# Patient Record
Sex: MALE | Race: WHITE
[De-identification: names, ages, dates, MRNs, and addresses within clinical notes are randomized per-mention and may not be internally consistent; named-entity substitution may affect disease eponyms.]

---

## 2021-11-08 ENCOUNTER — HOSPITAL ENCOUNTER (EMERGENCY)
Dept: HOSPITAL 94 - ER | Age: 61
Discharge: PSYCHIATRIC OR SUBSTANCE USE FACILITY | End: 2021-11-08
Payer: MEDICARE

## 2021-11-08 VITALS — WEIGHT: 210.43 LBS | HEIGHT: 71 IN | BODY MASS INDEX: 29.46 KG/M2

## 2021-11-08 DIAGNOSIS — Z87.442: ICD-10-CM

## 2021-11-08 DIAGNOSIS — Z20.822: ICD-10-CM

## 2021-11-08 DIAGNOSIS — Z79.899: ICD-10-CM

## 2021-11-08 DIAGNOSIS — F20.9: Primary | ICD-10-CM

## 2021-11-08 DIAGNOSIS — I10: ICD-10-CM

## 2021-11-08 LAB
ALBUMIN SERPL BCP-MCNC: 3.9 G/DL (ref 3.4–5)
ALBUMIN/GLOB SERPL: 1.1 {RATIO} (ref 1.1–1.5)
ALP SERPL-CCNC: 84 IU/L (ref 46–116)
ALT SERPL W P-5'-P-CCNC: 24 U/L (ref 12–78)
AMPHETAMINES UR QL SCN: NEGATIVE
ANION GAP SERPL CALCULATED.3IONS-SCNC: 11 MMOL/L (ref 8–16)
AST SERPL W P-5'-P-CCNC: 31 U/L (ref 10–37)
BARBITURATES UR QL SCN: NEGATIVE
BASOPHILS # BLD AUTO: 0 X10'3 (ref 0–0.2)
BASOPHILS NFR BLD AUTO: 0.6 % (ref 0–1)
BENZODIAZ UR QL SCN: NEGATIVE
BILIRUB SERPL-MCNC: 0.5 MG/DL (ref 0.1–1)
BUN SERPL-MCNC: 10 MG/DL (ref 7–18)
BUN/CREAT SERPL: 8.3 (ref 5.4–32)
BZE UR QL SCN: NEGATIVE
CALCIUM SERPL-MCNC: 9.2 MG/DL (ref 8.5–10.1)
CANNABINOIDS UR QL SCN: NEGATIVE
CHLORIDE SERPL-SCNC: 105 MMOL/L (ref 99–107)
CLARITY UR: CLEAR
CO2 SERPL-SCNC: 27.9 MMOL/L (ref 24–32)
COLOR UR: YELLOW
CREAT SERPL-MCNC: 1.2 MG/DL (ref 0.6–1.1)
EOSINOPHIL # BLD AUTO: 0.1 X10'3 (ref 0–0.9)
EOSINOPHIL NFR BLD AUTO: 1.6 % (ref 0–6)
ERYTHROCYTE [DISTWIDTH] IN BLOOD BY AUTOMATED COUNT: 13.5 % (ref 11.5–14.5)
ETHANOL SERPL-MCNC: < 0.01 GM/DL (ref 0–0.01)
GFR SERPL CREATININE-BSD FRML MDRD: 62 ML/MIN
GLUCOSE SERPL-MCNC: 121 MG/DL (ref 70–104)
GLUCOSE UR STRIP-MCNC: NEGATIVE MG/DL
HCT VFR BLD AUTO: 43.8 % (ref 42–52)
HGB BLD-MCNC: 15.1 G/DL (ref 14–17.9)
HGB UR QL STRIP: NEGATIVE
KETONES UR STRIP-MCNC: NEGATIVE MG/DL
LEUKOCYTE ESTERASE UR QL STRIP: NEGATIVE
LYMPHOCYTES # BLD AUTO: 1 X10'3 (ref 1.1–4.8)
LYMPHOCYTES NFR BLD AUTO: 20.2 % (ref 21–51)
MCH RBC QN AUTO: 30.4 PG (ref 27–31)
MCHC RBC AUTO-ENTMCNC: 34.6 G/DL (ref 33–36.5)
MCV RBC AUTO: 87.8 FL (ref 78–98)
METHADONE UR QL SCN: NEGATIVE
MONOCYTES # BLD AUTO: 0.5 X10'3 (ref 0–0.9)
MONOCYTES NFR BLD AUTO: 9.8 % (ref 2–12)
NEUTROPHILS # BLD AUTO: 3.3 X10'3 (ref 1.8–7.7)
NEUTROPHILS NFR BLD AUTO: 67.8 % (ref 42–75)
NITRITE UR QL STRIP: NEGATIVE
OPIATES UR QL SCN: NEGATIVE
PCP UR QL SCN: NEGATIVE
PH UR STRIP: 7.5 [PH] (ref 4.8–8)
PLATELET # BLD AUTO: 189 X10'3 (ref 140–440)
PMV BLD AUTO: 6.9 FL (ref 7.4–10.4)
POTASSIUM SERPL-SCNC: 3.9 MMOL/L (ref 3.5–5.1)
PROT SERPL-MCNC: 7.6 G/DL (ref 6.4–8.2)
PROT UR QL STRIP: NEGATIVE MG/DL
RBC # BLD AUTO: 4.98 X10'6 (ref 4.7–6.1)
SODIUM SERPL-SCNC: 144 MMOL/L (ref 135–145)
SP GR UR STRIP: 1 (ref 1–1.03)
URN COLLECT METHOD CLASS: (no result)
UROBILINOGEN UR STRIP-MCNC: 0.2 E.U/DL (ref 0.2–1)
WBC # BLD AUTO: 4.8 X10'3 (ref 4.5–11)

## 2021-11-08 PROCEDURE — 36415 COLL VENOUS BLD VENIPUNCTURE: CPT

## 2021-11-08 PROCEDURE — 96372 THER/PROPH/DIAG INJ SC/IM: CPT

## 2021-11-08 PROCEDURE — 87635 SARS-COV-2 COVID-19 AMP PRB: CPT

## 2021-11-08 PROCEDURE — 80305 DRUG TEST PRSMV DIR OPT OBS: CPT

## 2021-11-08 PROCEDURE — 99285 EMERGENCY DEPT VISIT HI MDM: CPT

## 2021-11-08 PROCEDURE — 84443 ASSAY THYROID STIM HORMONE: CPT

## 2021-11-08 PROCEDURE — 85025 COMPLETE CBC W/AUTO DIFF WBC: CPT

## 2021-11-08 PROCEDURE — 80053 COMPREHEN METABOLIC PANEL: CPT

## 2021-11-08 PROCEDURE — 80320 DRUG SCREEN QUANTALCOHOLS: CPT

## 2021-11-08 PROCEDURE — 81003 URINALYSIS AUTO W/O SCOPE: CPT

## 2021-11-08 NOTE — NUR
The patient is refusing medications and stated he has no problems but he would 
be willing to take methadone.

## 2021-11-08 NOTE — NUR
The patient was moved to bed 26 in the ER overflow. He was quiet and pacing 
around his bed. At one point he was redirected back to his bed area by a male 
staff and he screamed back "Fuck you! You don't know who you are dealing with!" 
He did however go back to his bed. He was cooperative with the assessment 
however. He was asked why he was here and his reply was disarganized, fast, 
pressured and difficult to follow. He mentioned about something about [facility], television etc.

## 2021-11-09 RX ADMIN — OLANZAPINE SCH MG: 5 TABLET, ORALLY DISINTEGRATING ORAL at 20:00

## 2021-11-09 NOTE — NUR
0730 this am, spoke with [facility] Nurse [phone], patient has been 
prescribed Zyprexa 20mg BID and Cogentin 0.1mg BID.  Per custodial staff there is a 
good chance that Arsenio has been cheeking his medication.   Child aware, 
orders to be writte

## 2021-11-09 NOTE — NUR
The patient is slowly pacing in front of the nursing station. He is at times 
talking to himself and at other times laughing to himself.

## 2021-11-09 NOTE — NUR
Arsenio is laying on his bed, with the head of his bed up, arms crossed.  
Patient did give this writer the thumbs up when walking by.

## 2021-11-09 NOTE — NUR
The patient has been up and pacing periodically in the hallway. Attempted to 
ask the patient questions but he just shrugged his shoulders and did not give 
any verbal replies. He is laughing to himself. Eye contact is minimal. He is 
not able to state a plan for food shelter or clothing.

## 2021-11-09 NOTE — NUR
The patient is very internally preoccupied. Can be heard laughing to himself 
and at one point screamed out for no apparent reason. He is shadow boxing at 
the end of his bed.

## 2021-11-09 NOTE — NUR
Patient has been resting in bed since 9 this morning, patient has gotten up to 
use the restroom three times and ate 100% of lunch and asked for a second tray, 
this unit did have a extra tray and patient ate all of that tray as well.  
Arsenio will answer general question with yes and no, this writer did not 
attemept to talk about medication or any type of behavior that will be allowed 
on this unit.  Arsenio has been calm and pleasant up to this point.

## 2021-11-09 NOTE — NUR
LPS papers place in chart.  this wrtier called [facility] this am asking for [employer] 
papers, Sarah

## 2021-11-09 NOTE — NUR
Patient is mostly pacing all morning, right now patient is laying in his bed 
resting tossing from side to side.  Pending medicine orders.

## 2021-11-09 NOTE — NUR
The patient was up the majority of the night quietly pacing in the priest. He did 
nap for brief periods

## 2021-11-10 RX ADMIN — OLANZAPINE SCH MG: 5 TABLET, ORALLY DISINTEGRATING ORAL at 19:37

## 2021-11-10 RX ADMIN — OLANZAPINE SCH MG: 5 TABLET, ORALLY DISINTEGRATING ORAL at 08:00

## 2021-11-10 NOTE — NUR
RN received pt. awake, lying in supine position in 4 point restraints in bed. 
Pt.'s vitals obtain with /80, HR 90, SP02 95%, and HR 20. 

-------------------------------------------------------------------------------

Addendum: 11/10/21 at 0652 by NADIA

-------------------------------------------------------------------------------

Correction, Resp 20.

## 2021-11-10 NOTE — NUR
Pt. yelling, "I want out!" RN attempted to debrief with pt. regarding his 
aggressive behavior. Pt. states, "I takes two to tango". Pt. deemed unsafe to 
release from restraints.

## 2021-11-10 NOTE — NUR
attempted to assess the patient for injury but the patient stated, "fuck you!" 
and would not answer any questions.

## 2021-11-10 NOTE — NUR
Pt. released from 3 of 4 restraints. Pt.'s right ankle mainted in restraint. 
Pt. ate all of his breakfast and went to sleep.

## 2021-11-10 NOTE — NUR
The patient suddenly became extremely agitated and started charging staff and 
spitting on the floor. He was redirected back to his bed but he replied, "Fuck 
you nigger!" Zeina dawkins called. Patient placed on the floor and contained. Dr. Baptiste made aware and IM med orders were received. Patient moved to his bed and 
placed in 4 point restraints.

## 2021-11-10 NOTE — NUR
The patient refusing to speak with staff. He has been resting on his bed. He 
did not eat his dinner.

## 2021-11-11 LAB
BASOPHILS # BLD AUTO: 0 X10'3 (ref 0–0.2)
BASOPHILS NFR BLD AUTO: 0.2 % (ref 0–1)
EOSINOPHIL # BLD AUTO: 0.1 X10'3 (ref 0–0.9)
EOSINOPHIL NFR BLD AUTO: 0.8 % (ref 0–6)
ERYTHROCYTE [DISTWIDTH] IN BLOOD BY AUTOMATED COUNT: 13.4 % (ref 11.5–14.5)
HCT VFR BLD AUTO: 38.5 % (ref 42–52)
HGB BLD-MCNC: 13.2 G/DL (ref 14–17.9)
LYMPHOCYTES # BLD AUTO: 1 X10'3 (ref 1.1–4.8)
LYMPHOCYTES NFR BLD AUTO: 12 % (ref 21–51)
MCH RBC QN AUTO: 30.1 PG (ref 27–31)
MCHC RBC AUTO-ENTMCNC: 34.3 G/DL (ref 33–36.5)
MCV RBC AUTO: 87.7 FL (ref 78–98)
MONOCYTES # BLD AUTO: 0.7 X10'3 (ref 0–0.9)
MONOCYTES NFR BLD AUTO: 8.8 % (ref 2–12)
NEUTROPHILS # BLD AUTO: 6.5 X10'3 (ref 1.8–7.7)
NEUTROPHILS NFR BLD AUTO: 78.2 % (ref 42–75)
PLATELET # BLD AUTO: 163 X10'3 (ref 140–440)
PMV BLD AUTO: 7 FL (ref 7.4–10.4)
RBC # BLD AUTO: 4.39 X10'6 (ref 4.7–6.1)
WBC # BLD AUTO: 8.3 X10'3 (ref 4.5–11)

## 2021-11-11 RX ADMIN — OLANZAPINE SCH MG: 5 TABLET, ORALLY DISINTEGRATING ORAL at 10:23

## 2021-11-11 RX ADMIN — OLANZAPINE SCH MG: 5 TABLET, ORALLY DISINTEGRATING ORAL at 19:31

## 2021-11-11 NOTE — NUR
The patient is isolating on his bed. He is not engaging with staff or peers. He 
initially refused to take his zyprexa but when given the option of taking the 
medication orally or receiving it IM he did end up taking the zyprexa zydis. He 
was observed until the zydis had a chance to melt in his mouth.

## 2021-11-11 NOTE — NUR
Pt. had x2 episodes of scant red colored emesis. Provider contact and orders 
received for CBC and and occult stool. Pt. cooperative with lab draw.
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
## 2021-11-12 RX ADMIN — OLANZAPINE SCH MG: 5 TABLET, ORALLY DISINTEGRATING ORAL at 20:10

## 2021-11-12 RX ADMIN — OLANZAPINE SCH MG: 5 TABLET, ORALLY DISINTEGRATING ORAL at 07:16

## 2021-11-12 NOTE — NUR
patient has used the restroom 4 times in the last half hour. This RN asked 
patient if he is okay and he just shrugged. will cont to monitor.

## 2021-11-12 NOTE — NUR
Pt up for breakfast which he ate standing up, then he paced the unit which he 
continues to do. Pt calm without signs of agitation.

## 2021-11-12 NOTE — NUR
patient pacing floor, drinking alot of water asked patient to slow down or he 
will make himself sick. Put water down and is sitting on side of bed for now.

## 2021-11-12 NOTE — NUR
Pt has been lying quietly in bed, occasionally getting up to use the bathroom. 
Pt remains calm without acting out bx.

## 2021-11-12 NOTE — NUR
The patient is awake and pacing at his bedside. He changed his bed linens. He 
appears to be responding to internal stimuli and making angry comments 
something about high school.

## 2021-11-13 RX ADMIN — OLANZAPINE SCH MG: 5 TABLET, ORALLY DISINTEGRATING ORAL at 08:14

## 2021-11-13 RX ADMIN — OLANZAPINE SCH MG: 5 TABLET, ORALLY DISINTEGRATING ORAL at 19:30

## 2021-11-13 NOTE — NUR
The patient is resistive to nursing interventions. He wants to be left alone. 
He declined physical assessment. He currently denies nausea. He did take his 
zyprexa as ordered without difficulty.

## 2021-11-13 NOTE — NUR
Pt pacing this morning. Drinking decaf coffee and water. Cooperative. Took am 
med with coffee. Ate breakfast

## 2021-11-13 NOTE — NUR
Pt continues to refuse staff to get emesis basin to clean out. He lays in bed 
does not respond to questions,.

## 2021-11-13 NOTE — NUR
patient came back out to nurses desk, started grabbing binders on nurses desk, 
asking for law books. Explained to patient that we do not have any as this is a 
hospital. patient then asked for a bible, bible provided, went back to his bed.

## 2021-11-13 NOTE — NUR
Pt had emesis at bedside in emesis basin. RN and PCT both tried to get basin 
and check on pt. Pt states "Back off" and louder each time. He states something 
about "this is evidence". RN asked if she could see the emesis basin for eval 
or if he was feeling sick or wanted meds and he states louder "back off ".

## 2021-11-14 RX ADMIN — OLANZAPINE SCH MG: 5 TABLET, ORALLY DISINTEGRATING ORAL at 20:00

## 2021-11-14 RX ADMIN — OLANZAPINE SCH MG: 5 TABLET, ORALLY DISINTEGRATING ORAL at 07:37

## 2021-11-14 RX ADMIN — OLANZAPINE SCH MG: 5 TABLET, ORALLY DISINTEGRATING ORAL at 07:20

## 2021-11-14 NOTE — NUR
The patient appears to be sleeping at this time. He has a good appetite and had 
two meal trays at breakfast.

## 2021-11-14 NOTE — NUR
The patient agiated. Refusing PO medications. Spitting at staff. Dr. Haji 
made aware and orders received. Security at the bedside and the patient did 
cooperate with getting IM medications.

## 2021-11-14 NOTE — NUR
Patient sitting on the side of the bed and spitting on the floor. He is 
agitated but staying on his bed.

## 2021-11-15 RX ADMIN — OLANZAPINE SCH MG: 5 TABLET, ORALLY DISINTEGRATING ORAL at 08:00

## 2021-11-15 RX ADMIN — OLANZAPINE SCH MG: 5 TABLET, ORALLY DISINTEGRATING ORAL at 20:22

## 2021-11-15 NOTE — NUR
Patient laying in bed awake.  Emesis x 3 and not in the last 1.5 hours.  RN 
advised Dr Best who will come asses when he can.  Continue to monitor.

## 2021-11-15 NOTE — NUR
Patient brought back to Room 26 in his bed from the main ED.  Patient with a 
big smile on his face and said he enjoyed the ride.  Continue to monitor.

## 2021-11-15 NOTE — NUR
Patient refused his morning meds.  RN advised patient that he would be getting 
an injection.  Patient throws up his hands and walks away.  RN advised Dr Faith 
who ordered 10 mg Zyprexa I.M.  Continue to monitor.

## 2021-11-15 NOTE — NUR
pt was in bed resting at change of shift with his eyes open, does not 
acknowledge staff when spoken to.

## 2021-11-15 NOTE — NUR
Patient pacing up and down in front of the nurses station.  No distress 
observed.  Continue to monitor.

## 2021-11-15 NOTE — NUR
Patient started spitting in the trash can and then he vomited.  Looked clear 
with some mucus.  Patient drinks a lot of water.  Patient refused towel for 
himself which was offered by RN.  RN asked if he had a stomach ache and patient 
said no.  "I am vomiting because of the ice you gave me."  Patient is paranoid 
and that is why he refuses his medication everyday.  Continue to monitor.

## 2021-11-15 NOTE — NUR
Patient laid down in hid be as requested by RN.  Security at side with a taser 
light shining on the ground.  Patient does not move and allows RN to give 
patient his injection.  Security leaves.  Patient is calm and in no distress.  
Continue to monitor.

## 2021-11-15 NOTE — NUR
Pt initially refused PO zyprexa, ate his dinner. IM Zyprexa was ordered and 
when patient was done eating he said hello. Asked patient if he wanted his meds 
and he said "sure." Handed patient his med cup and he took PO zyprexa. Asked 
patient how his day was and he shrugged, asked him if he feels like his 
medicine is working for him? Pt replied "I feel mentally healthy." Pt is pacing 
at the end of his bed and scratching his head.

## 2021-11-16 RX ADMIN — OLANZAPINE SCH MG: 5 TABLET, ORALLY DISINTEGRATING ORAL at 07:26

## 2021-11-16 RX ADMIN — OLANZAPINE SCH MG: 5 TABLET, ORALLY DISINTEGRATING ORAL at 20:33

## 2021-11-16 NOTE — NUR
-------------------------------------------------------------------------------

            *** Note priyaone in EDM - 11/16/21 at 1034 by RADHA ***            

-------------------------------------------------------------------------------

RN suctioned patient's mouth and changed his gown due to being wet.  Patient 
asked for me to open his diaper for his skin's sake.  RN complied and covered 
him with a sheet.  Patient content at the moment.  Continue to monitor.

## 2021-11-16 NOTE — NUR
Pt is awake drinking water and pacing, Pt is filling up his pitcher partially 
full. Pt mumbles "good morning" then corrects himself and shouts "good 
morning."

## 2021-11-16 NOTE — NUR
Patient at the nurse's station talking about the Father;Son and Holy Ghost and 
smiling.  Patient is happy disorganized AEB, smiling at RN and having word 
salad "radio, water, heaven, Holy Ghost".  Then patient slaps the counter with 
his hand and gives RN big smile and goes back to pacing.  Continue to monitor.

## 2021-11-16 NOTE — NUR
Patient came up to nurses station, disorganized AEB "Eye, eye, eye, eye of the 
tiger, bibiana brother, bibiana sister, it took me 8 months, Yeah!"  Patient then 
walked away.

## 2021-11-16 NOTE — NUR
Patient walking in front of nurses station drinking coffee.  No distress 
observed.  Continue to monitor.

## 2021-11-16 NOTE — NUR
Patient gladly took his oral medication.  Patient is smiling and chatting with 
the nurse/techs.  Continue to monitor.

## 2021-11-16 NOTE — NUR
-------------------------------------------------------------------------------

            *** Note undone in ED - 11/16/21 at 1033 by RADHA ***            

-------------------------------------------------------------------------------

RN suctions patient when she hear's him cough and when he asks.  Continue to 
monitor.

## 2021-11-16 NOTE — NUR
Patient has been so happy today and I have never heard him speaking as much as 
he did today.  Patient spoke of his son who is in eighth grade and spoke of his 
sister.  Patient smiling and chatting with staff.  No distress observed.  
Continue to monitor.

## 2021-11-16 NOTE — NUR
Patient drinking coffee and states loudly "I'm doing great!".  Patient has been 
calm and in no distress.

## 2021-11-16 NOTE — NUR
patient is having a good night, in good spirits, talking about angels, the 
bible and the [employer] brother[employer]. Has had 4 cups of decaf coffee and took his po 
meds with no problems. patient has been pacing since shift started. Patient ate 
second meal tray left over. Has used the restroom several times. Patient has 
now laid down in his bed and is resting.

## 2021-11-17 RX ADMIN — OLANZAPINE SCH MG: 5 TABLET, ORALLY DISINTEGRATING ORAL at 19:32

## 2021-11-17 RX ADMIN — OLANZAPINE SCH MG: 5 TABLET, ORALLY DISINTEGRATING ORAL at 07:55

## 2021-11-17 NOTE — NUR
Received pt. awake, laying in bed with the lights on at the beginning of the 
shift, rr are even and unlabored.

## 2021-11-17 NOTE — NUR
The patient has been laying on his bed quietly resting. He did get up and eat 
his dinner. He also allowed a brief physical assessment. He took his HS zyprexa 
without incident.

## 2021-11-17 NOTE — NUR
patient was resting in bed when he stood up suddenly and yelled " FUCK" 
multiple times. I asked patient to calm down and he started pacing speaking 
loudly of br[place]ru[place], national guard, sin, and hearing aids. Then he started 
repeating " son of bitch" a few times and stopped talking. patient is now 
pacing silently.

## 2021-11-18 RX ADMIN — OLANZAPINE SCH MG: 5 TABLET, ORALLY DISINTEGRATING ORAL at 08:35

## 2021-11-18 RX ADMIN — OLANZAPINE SCH MG: 5 TABLET, ORALLY DISINTEGRATING ORAL at 20:32

## 2021-11-18 NOTE — NUR
Pt woke and ate lunch. Pt laid down in bed and fell cback asleep and remains 
sleeping w/o distress.

## 2021-11-18 NOTE — NUR
The patient was awakened for his evening medications. He initially stated 
gruffly "give me the shot" But he was easily talked into just taking it by 
mouth.

## 2021-11-18 NOTE — NUR
Pt woke and ate breakfast. In pleasant mood and recognised this RN from a 
different hospitalization. Pt took AM med and paced unit a bit.

## 2021-11-19 RX ADMIN — OLANZAPINE SCH MG: 5 TABLET, ORALLY DISINTEGRATING ORAL at 20:00

## 2021-11-19 RX ADMIN — OLANZAPINE SCH MG: 5 TABLET, ORALLY DISINTEGRATING ORAL at 07:10

## 2021-11-19 NOTE — NUR
Arsenio is resting in his bed, eye are closed until he heres foot steps, 
patient states "I am fine".  Patient has had no out bursts today.  No prns 
given, patient did aske for a morning shower, Gumaro Vela and  took patient 
to [facility] for a shower, patient is independent with his ADL's

## 2021-11-19 NOTE — NUR
Pt took am meds without issue and requested a shower. Pt escorted up to [facility] 
with RN and 3 security officers and he was able to shower. Pt cooperative and 
appreciative. Pt currently sleeping in bed.

## 2021-11-20 RX ADMIN — OLANZAPINE SCH MG: 5 TABLET, ORALLY DISINTEGRATING ORAL at 08:23

## 2021-11-20 RX ADMIN — OLANZAPINE SCH MG: 5 TABLET, ORALLY DISINTEGRATING ORAL at 19:48

## 2021-11-20 NOTE — NUR
Attempted to obtain vital signs and patient sat up on the edge of his bed. I 
asked to apply the pulse ox and he did, then immediately took it off and said 
"Here, take it!" Patient laid back down and mubbled "Does it look like I'm 
awake?" Nurse and I did not want to press the issue. Will attempt vital signs 
when pt seems more cooperative.

## 2021-11-20 NOTE — NUR
Patient is awake now. He is ambulating the unit. Patient is given water and hot 
chocolate. Patient is cooperative, he gives this writer a thumbs up.

## 2021-11-20 NOTE — NUR
Attempt was made at made pass, patient told me "stay back", and moved his arm 
as if he were going to hit me if I came any closer.  Security called and 
patient took medication without further difficulty.

## 2021-11-20 NOTE — NUR
Patient ambulated around, he yelled "God damn it!" Patient then returned to 
bed. He then returned t sleep.

## 2021-11-21 RX ADMIN — OLANZAPINE SCH MG: 5 TABLET, ORALLY DISINTEGRATING ORAL at 20:00

## 2021-11-21 RX ADMIN — OLANZAPINE SCH MG: 5 TABLET, ORALLY DISINTEGRATING ORAL at 07:45

## 2021-11-21 NOTE — NUR
At med time the pt states "this is forced isnt it? If I dont take this I have 
to have security up my ass."  This RN stated that if he didnt take his 
medication that he will get a shot.  The pt took his medication and said "this 
is bull***."

## 2021-11-21 NOTE — NUR
The patient remains in good spirits. He took his morning medications. He is 
drinking coffee and watching TV.

## 2021-11-22 RX ADMIN — OLANZAPINE SCH MG: 5 TABLET, ORALLY DISINTEGRATING ORAL at 19:24

## 2021-11-22 RX ADMIN — OLANZAPINE SCH MG: 5 TABLET, ORALLY DISINTEGRATING ORAL at 08:20

## 2021-11-22 NOTE — NUR
Writer place lunch tray on side table pt grumbled "you have the audacity." He 
then rolled over and went back to sleep.

## 2021-11-22 NOTE — NUR
Writer attempted to have a one on one with patient to assess mental health 
symptoms. Pt declined to answer, no aggressive gestures noted. Pt was compliant 
with medication, no gestures or verbal dispute noted.

## 2021-11-22 NOTE — NUR
Pt was lying in bed appeared to be sleeping. Pt was awoken for PM medication. 
Medication was dissolved in water. Pt took cup and began yelling "what am I 
taking!" "Why am I taking medication!" Pt didn't finish consuminng med and 
writer asked him to finish it. Again pt yelled "Why am I taking this!" Writer 
told pt security would be called if he continued to yell and not take his 
medication. Security was called and pt immediately finished meds.

## 2021-11-22 NOTE — NUR
Pt sleeping comfortably, respirations even and unlabored. Auditory snoring 
sounds noted. Pt ate 100% of his breakfast.

## 2021-11-23 RX ADMIN — OLANZAPINE SCH MG: 5 TABLET, ORALLY DISINTEGRATING ORAL at 20:24

## 2021-11-23 RX ADMIN — OLANZAPINE SCH MG: 5 TABLET, ORALLY DISINTEGRATING ORAL at 08:23

## 2021-11-23 NOTE — NUR
Pt ate 100% of his lunch, awake for a short time then back to sleep. Pt is now 
resting comfortably. TV on at foot of bed.

## 2021-11-24 RX ADMIN — OLANZAPINE SCH MG: 5 TABLET, ORALLY DISINTEGRATING ORAL at 19:41

## 2021-11-24 RX ADMIN — OLANZAPINE SCH MG: 5 TABLET, ORALLY DISINTEGRATING ORAL at 07:56

## 2021-11-24 NOTE — NUR
Pt. pacing in front of the nurse's station awaiting breakfast. He is now 
sitting at bedside eating, will continue to monitor.

## 2021-11-24 NOTE — NUR
Assumed care of patient, pt. in bed watching TV at this time. He smiles and 
greets this writer. When questioned how he is doing he states with a smile and 
enthusiasm, "I'm doing great!"

## 2021-11-24 NOTE — NUR
Per [facility], pt. is being considered for placement at [facility]. 
Facility requested pt's emar to verify medication compliance for at least 14 
days. This record was obtained from pharmacy and sent to [facility].

## 2021-11-25 RX ADMIN — BENZOCAINE PRN LOZENGE: 2.6; 15 LOZENGE ORAL at 13:06

## 2021-11-25 RX ADMIN — BENZOCAINE PRN LOZENGE: 2.6; 15 LOZENGE ORAL at 17:57

## 2021-11-25 RX ADMIN — BENZOCAINE PRN LOZENGE: 2.6; 15 LOZENGE ORAL at 15:52

## 2021-11-25 RX ADMIN — OLANZAPINE SCH MG: 5 TABLET, ORALLY DISINTEGRATING ORAL at 21:06

## 2021-11-25 RX ADMIN — OLANZAPINE SCH MG: 5 TABLET, ORALLY DISINTEGRATING ORAL at 08:20

## 2021-11-25 RX ADMIN — BENZOCAINE PRN LOZENGE: 2.6; 15 LOZENGE ORAL at 05:18

## 2021-11-25 RX ADMIN — OLANZAPINE SCH MG: 5 TABLET, ORALLY DISINTEGRATING ORAL at 08:00

## 2021-11-25 RX ADMIN — BENZOCAINE PRN LOZENGE: 2.6; 15 LOZENGE ORAL at 02:33

## 2021-11-25 NOTE — NUR
Pt. refused ordered Zyprexa Zydis 15mg, he appears to have increased phychosis 
and states adamantly in a delusinal way, "I gambled and I lost the game." 
Security was called for a show of force, however pt. continued to refuse 
medication, he stated, "I don't swollow, I'm not a queer! You can't make me 
take that, it's assult and battery." This writer attempted to educate pt. that 
he is conserved and cannot refuse medications, however pt. continued to 
adamantly refuse and made a guesture as if he would slap this writer's hand 
away threatening, "Don't touch me!" Endorsed to Dr. Best and obtained orders 
for Zyprexa 10mg IM and Benadryl 50mg IM. Pt. was cooperative with injections, 
and rolled onto his stomach when asked to by security. IM injections given in 
ventrogluteal areas on opposite sides. Pt. is laying in bed at this time, will 
continue to monitor. 



TV removed from pt's room and pt. was provided education that he must cooperate 
with the unit rules in order to have privileges. 

-------------------------------------------------------------------------------

Addendum: 11/25/21 at 0948 by HORACE

-------------------------------------------------------------------------------

Security did place hands on pt. for a few minutes during injection just to 
ensure safety, pt. was at no time physically restrained.

## 2021-11-25 NOTE — NUR
Dr. Best over to assess pt. at this time, pt. laying in bed with HOB 
elevated. He reports he has a productive cough, this writer provided pt. with a 
cup and asked him to show staff the mucus next time he produces. Per Dr. Best, lung sounds are clear and no indication of a chest x-ray needed at 
this time. This writer also endorsed to MD pt's slightly elevated temperature, 
will continue to monitor. Pt. was provided with a PRN Chloraseptic Lozenge.

## 2021-11-25 NOTE — NUR
Pt. up pacing and requesting to use the television at this time. He was again 
provided education by this writer that in order to use the television he must 
cooperate (take medications) while on the unit because use of the television is 
a privledge. Pt. continues to pace at this time back and fourth between his 
room and the bathroom. Will continue to monitor.

## 2021-11-25 NOTE — NUR
Assumed care of patient, pt. sitting up in bed drinking de-cafe coffee at this 
time. He smiles and greets this writer appropriately.

## 2021-11-25 NOTE — NUR
assumed care of pt from Nona RAMOS, pt is walking back and forth from his bed to 
nurses desk, calm, quiet and cooperative

## 2021-11-25 NOTE — NUR
Pt. resting in bed at this time on his right side, he continues to cough 
intermittently. PRN Lozenge provided and pt. reports contentment, will continue 
to monitor.

## 2021-11-25 NOTE — NUR
Pt. up to use the BR at this time, he then paces aroud his room a short time 
before sitting back at bedside. Pt. continues to have an intermittent, 
non-productive cough. Chloraseptic Lozenges administered as needed with 
effectiveness. Lung sounds are clear to auscultation bilaterally, will continue 
to monitor.

## 2021-11-25 NOTE — NUR
Pt. is laying in bed at this time, HOB elevated, he requests a PRN Lozenge. 
Medication administered, pt. appears to be resting comfortably.

## 2021-11-25 NOTE — NUR
The patient has been coughing. He is awake and watching tv. Vital signs 98.4, 
101, 24, 131/80 and 02 sats 98% on RA.

## 2021-11-25 NOTE — NUR
Pt. is sitting up in bed at this time with HOB elevated. He appears somewhat 
restless AEB constantly moving bilateral feet and he is observed to be 
responding to aloud to what appears to be internal stimuli at times with a soft 
voice, will continue to monitor.

## 2021-11-26 RX ADMIN — OLANZAPINE SCH MG: 5 TABLET, ORALLY DISINTEGRATING ORAL at 08:45

## 2021-11-26 RX ADMIN — BENZOCAINE PRN LOZENGE: 2.6; 15 LOZENGE ORAL at 05:47

## 2021-11-26 RX ADMIN — OLANZAPINE SCH MG: 5 TABLET, ORALLY DISINTEGRATING ORAL at 20:20

## 2021-11-26 NOTE — NUR
Patient sitting up in bed, declined lunch this is this thrid meal he has 
declined.  This writer offered Tylenol for general low grade fever and flater 
than normal affect, patient declined

## 2021-11-26 NOTE — NUR
Patient is awake, he is cooperive. Patient exhibits poverty of thought. He is 
cooperative. Patient tells this writer that he would like to watch [employer], a 
television is provided.

## 2021-11-26 NOTE — NUR
Patient was coughing last night and had a low grade temp. Covid test and CRX 
ordered.

Patient declined CXR, Covid screening done with security at bed side

## 2021-11-26 NOTE — NUR
Arsenio was pacing at shift change and is now resting with the head of his bed 
up.  Patient is responding to question appropriately with yes and no answers.

## 2021-11-26 NOTE — NUR
-------------------------------------------------------------------------------

          *** Note priyaone in EDM - 11/26/21 at 2105 by KAITLIN ***          

-------------------------------------------------------------------------------

Patient sleeping quietly, SP02 is 98%. 84 pulse. A MgS04 level is ordered. QT 
is borderling per PA.

## 2021-11-26 NOTE — NUR
Offered patient am medication in 15ml of water, patient states "I am not 
mentally ill", patient went on to taking his medication, declined anything to 
rinse them down.  Medication is ODT.

## 2021-11-27 RX ADMIN — OLANZAPINE SCH MG: 5 TABLET, ORALLY DISINTEGRATING ORAL at 08:00

## 2021-11-27 RX ADMIN — OLANZAPINE SCH MG: 5 TABLET, ORALLY DISINTEGRATING ORAL at 19:17

## 2021-11-27 RX ADMIN — BENZOCAINE PRN LOZENGE: 2.6; 15 LOZENGE ORAL at 05:15

## 2021-11-27 RX ADMIN — BENZOCAINE PRN LOZENGE: 2.6; 15 LOZENGE ORAL at 11:17

## 2021-11-27 NOTE — NUR
Patient remains awake and watching television. He ate breakfast, he has slept 
through lunch as of yet.

## 2021-11-27 NOTE — NUR
pt is watching tv. when asked how he is doing, he replied, "fantastic"

pt accepted hs meds without issue.

## 2021-11-27 NOTE — NUR
Patient coughs frequently. Dry in nature. Patient requests and was given a 
chloroseptic lozente. He rests quietly in a mid fowlers position watching 
television.

## 2021-11-27 NOTE — NUR
Patient is awake, watching old westerns on television. When asked how the 
movies are the patient gives two thumbs up. The patient requested a lozenge for 
his cough. A lozenge was provided.

## 2021-11-28 VITALS — SYSTOLIC BLOOD PRESSURE: 129 MMHG | DIASTOLIC BLOOD PRESSURE: 88 MMHG

## 2021-11-28 RX ADMIN — OLANZAPINE SCH MG: 5 TABLET, ORALLY DISINTEGRATING ORAL at 08:00

## 2021-11-28 RX ADMIN — BENZOCAINE PRN LOZENGE: 2.6; 15 LOZENGE ORAL at 05:12

## 2023-05-24 NOTE — NUR
"Psychoeducation Group Documentation    PATIENT'S NAME: Mainor Madsen  MRN:   [id number]  :   2008  ACCT. NUMBER: [id number]  DATE OF SERVICE: 23  START TIME:  7:20 PM  END TIME:  8:10 PM  FACILITATOR(S): Tabitha Peters RN  TOPIC: BEH Pyschoeducation  Number of patients attending the group:  5  Group Length:  1 Hours    Dimensions addressed 2    Summary of Group / Topics Discussed:    Health Education:  The health benefits of spending time outdoors. Clients will discuss the importance of find time to be outside, and the benefits it has on both physical and mental health.  Clients viewed a video on \"ecotherapy\" and outdoor mindfulness practices, discussed the benefits on mental health. Another video on the benefits of sunlight was viewed, clients discussed how sunlight can have antibacterial effects and Vitamin D being important for serotonin production. Finally, clients discussed the importance of sunscreen application during the day and bug spray application when coming in contact with mosquitos.     Learning objectives: Identify the benefits of daily time outdoors on mental health, identify how sunshine can improve physical health, identify the role Vitamin D plays in serotonin production, express an understanding of proper sunscreen application.         Group Attendance:  Attended group session    Patient's response to the group topic/interactions:  cooperative with task, discussed personal experience with topic and listened actively    Patient appeared to be Actively participating, Attentive and Engaged.         Client specific details:  Client viewed the videos, some side conversation in between. Was minimally distracted, interrupting peers with comments, but responded to redirections when given. Offered some insights based on personal experience and asked questions.       " Pt. awake at this time, standing by his bed having a snack. No s/s of distress 
noted, will continue to monitor.

## 2025-05-26 NOTE — NUR
Pt. was cooperative with medications while security stood by. He is sleeping in 
bed at this time. 89